# Patient Record
Sex: MALE | Race: WHITE | Employment: FULL TIME | URBAN - METROPOLITAN AREA
[De-identification: names, ages, dates, MRNs, and addresses within clinical notes are randomized per-mention and may not be internally consistent; named-entity substitution may affect disease eponyms.]

---

## 2020-08-21 ENCOUNTER — TELEPHONE (OUTPATIENT)
Dept: INTERNAL MEDICINE | Facility: CLINIC | Age: 40
End: 2020-08-21

## 2021-03-25 ENCOUNTER — IMMUNIZATION (OUTPATIENT)
Dept: PRIMARY CARE CLINIC | Facility: CLINIC | Age: 41
End: 2021-03-25

## 2021-03-25 DIAGNOSIS — Z23 NEED FOR VACCINATION: Primary | ICD-10-CM

## 2021-03-25 PROCEDURE — 0001A COVID-19, MRNA, LNP-S, PF, 30 MCG/0.3 ML DOSE VACCINE: CPT | Mod: CV19,S$GLB,, | Performed by: INTERNAL MEDICINE

## 2021-03-25 PROCEDURE — 0001A COVID-19, MRNA, LNP-S, PF, 30 MCG/0.3 ML DOSE VACCINE: ICD-10-PCS | Mod: CV19,S$GLB,, | Performed by: INTERNAL MEDICINE

## 2021-03-25 PROCEDURE — 91300 COVID-19, MRNA, LNP-S, PF, 30 MCG/0.3 ML DOSE VACCINE: CPT | Mod: S$GLB,,, | Performed by: INTERNAL MEDICINE

## 2021-03-25 PROCEDURE — 91300 COVID-19, MRNA, LNP-S, PF, 30 MCG/0.3 ML DOSE VACCINE: ICD-10-PCS | Mod: S$GLB,,, | Performed by: INTERNAL MEDICINE

## 2021-04-15 ENCOUNTER — IMMUNIZATION (OUTPATIENT)
Dept: PRIMARY CARE CLINIC | Facility: CLINIC | Age: 41
End: 2021-04-15

## 2021-04-15 DIAGNOSIS — Z23 NEED FOR VACCINATION: Primary | ICD-10-CM

## 2021-04-15 PROCEDURE — 91300 COVID-19, MRNA, LNP-S, PF, 30 MCG/0.3 ML DOSE VACCINE: ICD-10-PCS | Mod: S$GLB,,, | Performed by: INTERNAL MEDICINE

## 2021-04-15 PROCEDURE — 0002A COVID-19, MRNA, LNP-S, PF, 30 MCG/0.3 ML DOSE VACCINE: CPT | Mod: CV19,S$GLB,, | Performed by: INTERNAL MEDICINE

## 2021-04-15 PROCEDURE — 0002A COVID-19, MRNA, LNP-S, PF, 30 MCG/0.3 ML DOSE VACCINE: ICD-10-PCS | Mod: CV19,S$GLB,, | Performed by: INTERNAL MEDICINE

## 2021-04-15 PROCEDURE — 91300 COVID-19, MRNA, LNP-S, PF, 30 MCG/0.3 ML DOSE VACCINE: CPT | Mod: S$GLB,,, | Performed by: INTERNAL MEDICINE

## 2023-12-11 ENCOUNTER — OFFICE VISIT (OUTPATIENT)
Dept: URGENT CARE | Facility: CLINIC | Age: 43
End: 2023-12-11
Payer: COMMERCIAL

## 2023-12-11 VITALS
DIASTOLIC BLOOD PRESSURE: 97 MMHG | WEIGHT: 200 LBS | TEMPERATURE: 98 F | RESPIRATION RATE: 18 BRPM | SYSTOLIC BLOOD PRESSURE: 149 MMHG | HEIGHT: 71 IN | BODY MASS INDEX: 28 KG/M2 | OXYGEN SATURATION: 99 % | HEART RATE: 104 BPM

## 2023-12-11 DIAGNOSIS — R09.82 POST-NASAL DRIP: ICD-10-CM

## 2023-12-11 DIAGNOSIS — J02.9 SORE THROAT: ICD-10-CM

## 2023-12-11 DIAGNOSIS — H92.02 OTALGIA, LEFT EAR: ICD-10-CM

## 2023-12-11 DIAGNOSIS — H93.8X3 EAR PRESSURE, BILATERAL: ICD-10-CM

## 2023-12-11 DIAGNOSIS — J02.0 STREPTOCOCCAL PHARYNGITIS: Primary | ICD-10-CM

## 2023-12-11 DIAGNOSIS — R00.0 TACHYCARDIA: ICD-10-CM

## 2023-12-11 LAB
CTP QC/QA: YES
MOLECULAR STREP A: POSITIVE

## 2023-12-11 PROCEDURE — 87651 STREP A DNA AMP PROBE: CPT | Mod: QW,S$GLB,, | Performed by: NURSE PRACTITIONER

## 2023-12-11 PROCEDURE — 99203 OFFICE O/P NEW LOW 30 MIN: CPT | Mod: S$GLB,,, | Performed by: NURSE PRACTITIONER

## 2023-12-11 PROCEDURE — 87651 POCT STREP A MOLECULAR: ICD-10-PCS | Mod: QW,S$GLB,, | Performed by: NURSE PRACTITIONER

## 2023-12-11 PROCEDURE — 99203 PR OFFICE/OUTPT VISIT, NEW, LEVL III, 30-44 MIN: ICD-10-PCS | Mod: S$GLB,,, | Performed by: NURSE PRACTITIONER

## 2023-12-11 RX ORDER — DEXTROAMPHETAMINE SACCHARATE, AMPHETAMINE ASPARTATE MONOHYDRATE, DEXTROAMPHETAMINE SULFATE AND AMPHETAMINE SULFATE 5; 5; 5; 5 MG/1; MG/1; MG/1; MG/1
20 CAPSULE, EXTENDED RELEASE ORAL EVERY MORNING
COMMUNITY

## 2023-12-11 RX ORDER — AZITHROMYCIN 250 MG/1
TABLET, FILM COATED ORAL
Qty: 6 TABLET | Refills: 0 | Status: SHIPPED | OUTPATIENT
Start: 2023-12-11 | End: 2023-12-16

## 2023-12-11 NOTE — PROGRESS NOTES
"Subjective:      Patient ID: José Miguel Molina is a 43 y.o. male.    Vitals:  height is 5' 11" (1.803 m) and weight is 90.7 kg (200 lb). His oral temperature is 97.8 °F (36.6 °C). His blood pressure is 149/97 (abnormal) and his pulse is 104. His respiration is 18 and oxygen saturation is 99%.     Chief Complaint: Sore Throat    Pt is a 43 y.o. male with the complaint of a sore throat and ear pain.   Other symptoms are postnasal drip   Symptoms began 6 days ago   Pseudafed and Flonase     43-year-old male presents to clinic with complaints of sore throat, painful swallowing, neck pain, cough, ear pain, postnasal drip treating with Sudafed and Flonase          Sore Throat   This is a new problem. The current episode started in the past 7 days. The problem has been gradually worsening. There has been no fever. The pain is at a severity of 7/10. Associated symptoms include congestion, coughing, ear pain, a plugged ear sensation, neck pain, swollen glands and trouble swallowing. Pertinent negatives include no abdominal pain, diarrhea, drooling, ear discharge, headaches, hoarse voice, shortness of breath, stridor or vomiting. He has had no exposure to strep or mono. Treatments tried: mucinex, flonase.     Constitution: Negative for chills, sweating, fatigue and fever.   HENT:  Positive for ear pain, congestion, postnasal drip, sore throat and trouble swallowing. Negative for ear discharge and drooling.    Neck: Positive for neck pain and painful lymph nodes.   Respiratory:  Positive for cough. Negative for shortness of breath and stridor.    Gastrointestinal:  Negative for abdominal pain, vomiting and diarrhea.   Skin:  Negative for erythema.   Neurological:  Negative for headaches.   Hematologic/Lymphatic: Positive for swollen lymph nodes.      Objective:     Physical Exam   Constitutional: He is oriented to person, place, and time. He appears well-developed.   HENT:   Head: Normocephalic and atraumatic. Head is " without abrasion, without contusion and without laceration.   Ears:   Right Ear: External ear normal. Tympanic membrane is bulging.   Left Ear: External ear normal. Tympanic membrane is bulging.   Nose: Mucosal edema and rhinorrhea present.   Mouth/Throat: Oropharynx is clear and moist and mucous membranes are normal.   Eyes: Conjunctivae, EOM and lids are normal. Pupils are equal, round, and reactive to light.   Neck: Trachea normal and phonation normal. Neck supple.   Cardiovascular: Regular rhythm and normal heart sounds. Tachycardia present.   Pulmonary/Chest: Effort normal and breath sounds normal. No stridor. No respiratory distress.   Musculoskeletal: Normal range of motion.         General: Normal range of motion.   Lymphadenopathy:        Head (right side): Submandibular adenopathy present.        Head (left side): Submandibular adenopathy present.   Neurological: He is alert and oriented to person, place, and time.   Skin: Skin is warm, dry, intact and no rash. Capillary refill takes less than 2 seconds. No abrasion, No burn, No bruising, No erythema and No ecchymosis   Psychiatric: His speech is normal and behavior is normal. Judgment and thought content normal.   Nursing note and vitals reviewed.      Assessment:     1. Streptococcal pharyngitis    2. Sore throat    3. Tachycardia    4. Otalgia, left ear    5. Ear pressure, bilateral    6. Post-nasal drip      Results for orders placed or performed in visit on 12/11/23   POCT Strep A, Molecular   Result Value Ref Range    Molecular Strep A, POC Positive (A) Negative     Acceptable Yes         Plan:       Streptococcal pharyngitis  -     azithromycin (Z-RAVI) 250 MG tablet; Take 2 tablets by mouth on day 1; Take 1 tablet by mouth on days 2-5  Dispense: 6 tablet; Refill: 0    Sore throat  -     POCT Strep A, Molecular    Tachycardia    Otalgia, left ear    Ear pressure, bilateral    Post-nasal drip      Patient Instructions    Strep Throat -  Your Rapid Strep Test was POSITIVE.        If your condition worsens or fails to improve we recommend that you receive another evaluation at the ER immediately for any worsening of symptoms such as increase in throat pain, trouble breathing or speaking, shortness of breath, neck stiffness, ear pain, increased tiredness, ect.   or contact your PCP to discuss your concerns or return here.      You must understand that you've received an urgent care treatment only and that you may be released before all your medical problems are known or treated. You the patient will arrange for followup care as instructed.      Cool liquids as much as possible.  Avoid any foods or beverages that may cause irritation to the throat (spicy, acidic, rough)     Tylenol or ibuprofen for fever or pain as directed.       Rest is important.      Complete full course of antibiotics.     Use a new toothbrush now and another new toothbrush after completion of antibiotics.     Follow up with your Pcp in the next 2-3 days or sooner for re-eval especially if no improvement.       Parents verbalizes understanding and agrees with plan of care.

## 2025-04-10 ENCOUNTER — HOSPITAL ENCOUNTER (EMERGENCY)
Facility: OTHER | Age: 45
Discharge: HOME | End: 2025-04-10
Attending: EMERGENCY MEDICINE
Payer: COMMERCIAL

## 2025-04-10 VITALS
TEMPERATURE: 99 F | HEART RATE: 118 BPM | HEIGHT: 71 IN | RESPIRATION RATE: 20 BRPM | BODY MASS INDEX: 36.4 KG/M2 | SYSTOLIC BLOOD PRESSURE: 186 MMHG | DIASTOLIC BLOOD PRESSURE: 100 MMHG | WEIGHT: 260 LBS | OXYGEN SATURATION: 99 %

## 2025-04-10 DIAGNOSIS — R00.0 TACHYCARDIA: ICD-10-CM

## 2025-04-10 DIAGNOSIS — R19.7 NAUSEA, VOMITING, AND DIARRHEA: ICD-10-CM

## 2025-04-10 DIAGNOSIS — R51.9 ACUTE NONINTRACTABLE HEADACHE, UNSPECIFIED HEADACHE TYPE: Primary | ICD-10-CM

## 2025-04-10 DIAGNOSIS — R11.2 NAUSEA, VOMITING, AND DIARRHEA: ICD-10-CM

## 2025-04-10 LAB
ABSOLUTE EOSINOPHIL (OHS): 0 K/UL
ABSOLUTE MONOCYTE (OHS): 1.11 K/UL (ref 0.3–1)
ABSOLUTE NEUTROPHIL COUNT (OHS): 11.03 K/UL (ref 1.8–7.7)
ALBUMIN SERPL BCP-MCNC: 4.4 G/DL (ref 3.5–5.2)
ALP SERPL-CCNC: 93 UNIT/L (ref 40–150)
ALT SERPL W/O P-5'-P-CCNC: 81 UNIT/L (ref 10–44)
ANION GAP (OHS): 24 MMOL/L (ref 8–16)
AST SERPL-CCNC: 80 UNIT/L (ref 11–45)
BACTERIA #/AREA URNS AUTO: ABNORMAL /HPF
BASOPHILS # BLD AUTO: 0.09 K/UL
BASOPHILS NFR BLD AUTO: 0.7 %
BILIRUB SERPL-MCNC: 0.9 MG/DL (ref 0.1–1)
BILIRUB UR QL STRIP.AUTO: NEGATIVE
BUN SERPL-MCNC: 13 MG/DL (ref 6–20)
CALCIUM SERPL-MCNC: 8.7 MG/DL (ref 8.7–10.5)
CHLORIDE SERPL-SCNC: 92 MMOL/L (ref 95–110)
CLARITY UR: CLEAR
CO2 SERPL-SCNC: 14 MMOL/L (ref 23–29)
COLOR UR AUTO: YELLOW
CREAT SERPL-MCNC: 1 MG/DL (ref 0.5–1.4)
CTP QC/QA: YES
CTP QC/QA: YES
ERYTHROCYTE [DISTWIDTH] IN BLOOD BY AUTOMATED COUNT: 13.8 % (ref 11.5–14.5)
GFR SERPLBLD CREATININE-BSD FMLA CKD-EPI: >60 ML/MIN/1.73/M2
GLUCOSE SERPL-MCNC: 136 MG/DL (ref 70–110)
GLUCOSE UR QL STRIP: ABNORMAL
GRAN CASTS UR QL COMP ASSIST: 12 /LPF (ref ?–0)
HCT VFR BLD AUTO: 40.7 % (ref 40–54)
HCV AB SERPL QL IA: NEGATIVE
HGB BLD-MCNC: 14.3 GM/DL (ref 14–18)
HGB UR QL STRIP: ABNORMAL
HIV 1+2 AB+HIV1 P24 AG SERPL QL IA: NEGATIVE
HYALINE CASTS UR QL AUTO: 14 /LPF (ref 0–1)
IMM GRANULOCYTES # BLD AUTO: 0.05 K/UL (ref 0–0.04)
IMM GRANULOCYTES NFR BLD AUTO: 0.4 % (ref 0–0.5)
KETONES UR QL STRIP: ABNORMAL
LEUKOCYTE ESTERASE UR QL STRIP: NEGATIVE
LIPASE SERPL-CCNC: 133 U/L (ref 4–60)
LYMPHOCYTES # BLD AUTO: 1.21 K/UL (ref 1–4.8)
MCH RBC QN AUTO: 31 PG (ref 27–31)
MCHC RBC AUTO-ENTMCNC: 35.1 G/DL (ref 32–36)
MCV RBC AUTO: 88 FL (ref 82–98)
MICROSCOPIC COMMENT: ABNORMAL
NITRITE UR QL STRIP: NEGATIVE
NUCLEATED RBC (/100WBC) (OHS): 0 /100 WBC
PH UR STRIP: 6 [PH]
PLATELET # BLD AUTO: 477 K/UL (ref 150–450)
PMV BLD AUTO: 8.4 FL (ref 9.2–12.9)
POC MOLECULAR INFLUENZA A AGN: NEGATIVE
POC MOLECULAR INFLUENZA B AGN: NEGATIVE
POTASSIUM SERPL-SCNC: 3.8 MMOL/L (ref 3.5–5.1)
PROT SERPL-MCNC: 8.4 GM/DL (ref 6–8.4)
PROT UR QL STRIP: ABNORMAL
RBC # BLD AUTO: 4.62 M/UL (ref 4.6–6.2)
RBC #/AREA URNS AUTO: 1 /HPF (ref 0–4)
RELATIVE EOSINOPHIL (OHS): 0 %
RELATIVE LYMPHOCYTE (OHS): 9 % (ref 18–48)
RELATIVE MONOCYTE (OHS): 8.2 % (ref 4–15)
RELATIVE NEUTROPHIL (OHS): 81.7 % (ref 38–73)
SARS-COV-2 RDRP RESP QL NAA+PROBE: NEGATIVE
SODIUM SERPL-SCNC: 130 MMOL/L (ref 136–145)
SP GR UR STRIP: 1.03
SQUAMOUS #/AREA URNS AUTO: <1 /HPF
UROBILINOGEN UR STRIP-ACNC: NEGATIVE EU/DL
WBC # BLD AUTO: 13.49 K/UL (ref 3.9–12.7)
WBC #/AREA URNS AUTO: 1 /HPF (ref 0–5)

## 2025-04-10 PROCEDURE — 83690 ASSAY OF LIPASE: CPT | Performed by: EMERGENCY MEDICINE

## 2025-04-10 PROCEDURE — 81003 URINALYSIS AUTO W/O SCOPE: CPT | Performed by: NURSE PRACTITIONER

## 2025-04-10 PROCEDURE — 86803 HEPATITIS C AB TEST: CPT | Performed by: EMERGENCY MEDICINE

## 2025-04-10 PROCEDURE — 87389 HIV-1 AG W/HIV-1&-2 AB AG IA: CPT | Performed by: EMERGENCY MEDICINE

## 2025-04-10 PROCEDURE — 87635 SARS-COV-2 COVID-19 AMP PRB: CPT | Performed by: NURSE PRACTITIONER

## 2025-04-10 PROCEDURE — 85025 COMPLETE CBC W/AUTO DIFF WBC: CPT | Performed by: NURSE PRACTITIONER

## 2025-04-10 PROCEDURE — 93010 ELECTROCARDIOGRAM REPORT: CPT | Mod: ,,, | Performed by: INTERNAL MEDICINE

## 2025-04-10 PROCEDURE — 82374 ASSAY BLOOD CARBON DIOXIDE: CPT | Performed by: NURSE PRACTITIONER

## 2025-04-10 PROCEDURE — 96375 TX/PRO/DX INJ NEW DRUG ADDON: CPT

## 2025-04-10 PROCEDURE — 25000003 PHARM REV CODE 250: Performed by: EMERGENCY MEDICINE

## 2025-04-10 PROCEDURE — 96374 THER/PROPH/DIAG INJ IV PUSH: CPT

## 2025-04-10 PROCEDURE — 63600175 PHARM REV CODE 636 W HCPCS: Performed by: EMERGENCY MEDICINE

## 2025-04-10 PROCEDURE — 93005 ELECTROCARDIOGRAM TRACING: CPT

## 2025-04-10 PROCEDURE — 96361 HYDRATE IV INFUSION ADD-ON: CPT

## 2025-04-10 PROCEDURE — 99284 EMERGENCY DEPT VISIT MOD MDM: CPT | Mod: 25

## 2025-04-10 RX ORDER — KETOROLAC TROMETHAMINE 30 MG/ML
10 INJECTION, SOLUTION INTRAMUSCULAR; INTRAVENOUS
Status: COMPLETED | OUTPATIENT
Start: 2025-04-10 | End: 2025-04-10

## 2025-04-10 RX ORDER — LEVOTHYROXINE SODIUM 50 UG/1
50 TABLET ORAL
COMMUNITY

## 2025-04-10 RX ORDER — AMLODIPINE BESYLATE 10 MG/1
10 TABLET ORAL DAILY
COMMUNITY

## 2025-04-10 RX ORDER — PROCHLORPERAZINE EDISYLATE 5 MG/ML
5 INJECTION INTRAMUSCULAR; INTRAVENOUS
Status: COMPLETED | OUTPATIENT
Start: 2025-04-10 | End: 2025-04-10

## 2025-04-10 RX ADMIN — DEXTROSE AND SODIUM CHLORIDE 1000 ML: 5; 900 INJECTION, SOLUTION INTRAVENOUS at 06:04

## 2025-04-10 RX ADMIN — PROCHLORPERAZINE EDISYLATE 5 MG: 5 INJECTION INTRAMUSCULAR; INTRAVENOUS at 04:04

## 2025-04-10 RX ADMIN — SODIUM CHLORIDE 1000 ML: 0.9 INJECTION, SOLUTION INTRAVENOUS at 04:04

## 2025-04-10 RX ADMIN — KETOROLAC TROMETHAMINE 10 MG: 30 INJECTION, SOLUTION INTRAMUSCULAR; INTRAVENOUS at 04:04
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
## 2025-04-10 NOTE — FIRST PROVIDER EVALUATION
Emergency Department TeleTriage Encounter Note      CHIEF COMPLAINT    Chief Complaint   Patient presents with    Headache     Reports HA onset 2 days. Pt endorses recent visit to  for n/v which has now subsided. Pt took aleve 4 hours ago with no relief        VITAL SIGNS   Initial Vitals [04/10/25 1349]   BP Pulse Resp Temp SpO2   (!) 142/92 (!) 125 18 98.4 °F (36.9 °C) 97 %      MAP       --            ALLERGIES    Review of patient's allergies indicates:   Allergen Reactions    Penicillins Nausea And Vomiting       PROVIDER TRIAGE NOTE  Verbal consent for the teletriage evaluation was given by the patient at the start of the evaluation.  All efforts will be made to maintain patient's privacy during the evaluation.      This is a teletriage evaluation of a 44 y.o. male presenting to the ED with c/o HA that started last night; thinks he is dehydrated.  Had aleve at 8 am. Limited physical exam via telehealth: The patient is awake, alert, answering questions appropriately and is not in respiratory distress.  As the Teletriage provider, I performed an initial assessment and ordered appropriate labs and imaging studies, if any, to facilitate the patient's care once placed in the ED. Once a room is available, care and a full evaluation will be completed by an alternate ED provider.  Any additional orders and the final disposition will be determined by that provider.  All imaging and labs will not be followed-up by the Teletriage Team, including myself.          ORDERS  Labs Reviewed   HEPATITIS C ANTIBODY   HIV 1 / 2 ANTIBODY   CBC W/ AUTO DIFFERENTIAL   COMPREHENSIVE METABOLIC PANEL   URINALYSIS, REFLEX TO URINE CULTURE   SARS-COV-2 RDRP GENE       ED Orders (720h ago, onward)      Start Ordered     Status Ordering Provider    04/10/25 1446 04/10/25 1445  Saline lock IV  Once         Ordered BLAISE LANDRY    04/10/25 1446 04/10/25 1445  Orthostatic blood pressure  Once         Ordered BLAISE LANDRY    04/10/25 1446  04/10/25 1445  Cardiac Monitoring - Adult  Continuous        Comments: Notify Physician If:    Ordered GRISBLAISE MANZO    04/10/25 1446 04/10/25 1445  Pulse Oximetry Continuous  Continuous         Ordered BLAISE LANDRY    04/10/25 1446 04/10/25 1445  EKG 12-lead  Once         Ordered GRISBLAISE MANZO    04/10/25 1446 04/10/25 1445  CBC auto differential  STAT         Ordered GRISBLAISE MANZO    04/10/25 1446 04/10/25 1445  Comprehensive metabolic panel  STAT         Ordered GRIS, BLAISE MAX    04/10/25 1446 04/10/25 1445  Urinalysis, Reflex to Urine Culture Urine, Clean Catch  STAT         Ordered GRISBLAISE MANZO    04/10/25 1446 04/10/25 1445  POCT COVID-19 Rapid Screening  Once         Ordered BLAISE LANDRY    04/10/25 1446 04/10/25 1445  POCT Influenza A/B Molecular  Once         Ordered BLAISE LANDRY    04/10/25 1351 04/10/25 1350  Hepatitis C Antibody  STAT         Ordered CRISTINA SANTIAGO    04/10/25 1351 04/10/25 1350  HIV 1/2 Ag/Ab (4th Gen)  STAT         Ordered CRISTINA SANTIAGO              Virtual Visit Note: The provider triage portion of this emergency department evaluation and documentation was performed via [employer], a HIPAA-compliant telemedicine application, in concert with a tele-presenter in the room. A face to face patient evaluation with one of my colleagues will occur once the patient is placed in an emergency department room.      DISCLAIMER: This note was prepared with vocaltap voice recognition transcription software. Garbled syntax, mangled pronouns, and other bizarre constructions may be attributed to that software system.

## 2025-04-10 NOTE — ED TRIAGE NOTES
Pt reports headache since yesterday morning. He used to get migraines but have not in years. He feels like it is dehydration. He was seen at  for N/V on Monday. He says he has been unable to hold anything down. The last time he vomited was two hours ago. He also has diarrhea

## 2025-04-10 NOTE — Clinical Note
"José Miguel Ly" Molina was seen and treated in our emergency department on 4/10/2025.  He may return to work on 04/11/2025.       If you have any questions or concerns, please don't hesitate to call.       LPN    "

## 2025-04-11 NOTE — ED NOTES
ED MD aware of VS prior to d/c, pt states he w3ould still like to be discharged at this time, ED MD discussed VS with pt, pt still requesting d/c.

## 2025-04-14 LAB
OHS QRS DURATION: 86 MS
OHS QTC CALCULATION: 453 MS